# Patient Record
Sex: FEMALE | Race: WHITE | NOT HISPANIC OR LATINO | Employment: FULL TIME | URBAN - METROPOLITAN AREA
[De-identification: names, ages, dates, MRNs, and addresses within clinical notes are randomized per-mention and may not be internally consistent; named-entity substitution may affect disease eponyms.]

---

## 2024-05-02 ENCOUNTER — OFFICE VISIT (OUTPATIENT)
Dept: OPHTHALMOLOGY | Facility: CLINIC | Age: 59
End: 2024-05-02
Payer: COMMERCIAL

## 2024-05-02 DIAGNOSIS — M35.00 SJOGREN'S SYNDROME, WITH UNSPECIFIED ORGAN INVOLVEMENT (MULTI): Primary | ICD-10-CM

## 2024-05-02 DIAGNOSIS — Z79.899 LONG-TERM USE OF PLAQUENIL: ICD-10-CM

## 2024-05-02 DIAGNOSIS — H52.03 HYPEROPIA, BILATERAL: ICD-10-CM

## 2024-05-02 PROCEDURE — 92083 EXTENDED VISUAL FIELD XM: CPT | Performed by: OPHTHALMOLOGY

## 2024-05-02 PROCEDURE — 92014 COMPRE OPH EXAM EST PT 1/>: CPT | Performed by: OPHTHALMOLOGY

## 2024-05-02 PROCEDURE — 92134 CPTRZ OPH DX IMG PST SGM RTA: CPT | Performed by: OPHTHALMOLOGY

## 2024-05-02 RX ORDER — CLONAZEPAM 0.5 MG/1
TABLET ORAL
COMMUNITY

## 2024-05-02 RX ORDER — HYDROQUINONE 40 MG/G
CREAM TOPICAL
COMMUNITY
Start: 2023-01-09

## 2024-05-02 RX ORDER — NYSTATIN 100000 U/G
1 CREAM TOPICAL 2 TIMES DAILY
COMMUNITY
Start: 2023-08-18

## 2024-05-02 RX ORDER — CELECOXIB 200 MG/1
200 CAPSULE ORAL 2 TIMES DAILY
COMMUNITY

## 2024-05-02 ASSESSMENT — REFRACTION_WEARINGRX
OD_ADD: +1.75
OS_CYLINDER: -1.25
OS_AXIS: 140
OS_ADD: +1.75
OD_SPHERE: +3.50
OD_CYLINDER: -0.50
OS_SPHERE: +3.50
OD_AXIS: 035

## 2024-05-02 ASSESSMENT — REFRACTION_MANIFEST
OS_AXIS: 140
OD_AXIS: 035
OD_SPHERE: +4.25
OS_ADD: +1.75
OS_CYLINDER: -1.00
OS_SPHERE: +4.25
OD_CYLINDER: -0.25
OD_ADD: +1.75

## 2024-05-02 ASSESSMENT — VISUAL ACUITY
CORRECTION_TYPE: GLASSES
METHOD: SNELLEN - LINEAR
OS_CC: 20/25+2
OD_CC: 20/25-2

## 2024-05-02 ASSESSMENT — EXTERNAL EXAM - LEFT EYE: OS_EXAM: NORMAL

## 2024-05-02 ASSESSMENT — TONOMETRY
OS_IOP_MMHG: 12
IOP_METHOD: GOLDMANN APPLANATION
OD_IOP_MMHG: 12

## 2024-05-02 ASSESSMENT — CUP TO DISC RATIO
OD_RATIO: .3
OS_RATIO: .3

## 2024-05-02 ASSESSMENT — EXTERNAL EXAM - RIGHT EYE: OD_EXAM: NORMAL

## 2024-05-02 NOTE — PROGRESS NOTES
Assessment/Plan   Diagnoses and all orders for this visit:  Sjogren's syndrome, with unspecified organ involvement (Multi)  -     OCT, Retina - OU - Both Eyes  -     Molina Visual Field - OU - Both Eyes  vision, exam of macula, OCT macula and central visual [id number] normal and stable. I discussed the cumulative risk of treatment. Recommend repeat exam and testing in a year.   Long-term use of Plaquenil  Hyperopia, bilateral   Glasses Per VidSys optom

## 2025-04-17 ENCOUNTER — APPOINTMENT (OUTPATIENT)
Dept: GASTROENTEROLOGY | Facility: CLINIC | Age: 60
End: 2025-04-17
Payer: COMMERCIAL

## 2025-04-17 VITALS — HEIGHT: 66 IN | BODY MASS INDEX: 21.14 KG/M2

## 2025-04-17 DIAGNOSIS — K21.9 GASTROESOPHAGEAL REFLUX DISEASE WITHOUT ESOPHAGITIS: Primary | ICD-10-CM

## 2025-04-17 PROCEDURE — 99213 OFFICE O/P EST LOW 20 MIN: CPT | Performed by: INTERNAL MEDICINE

## 2025-04-17 RX ORDER — ESOMEPRAZOLE MAGNESIUM 40 MG/1
40 CAPSULE, DELAYED RELEASE ORAL
COMMUNITY
End: 2025-04-17 | Stop reason: DRUGHIGH

## 2025-04-17 RX ORDER — HYDROGEN PEROXIDE 3 %
20 SOLUTION, NON-ORAL MISCELLANEOUS
Qty: 30 CAPSULE | Refills: 11 | Status: SHIPPED | OUTPATIENT
Start: 2025-04-17 | End: 2026-04-17

## 2025-04-17 NOTE — PROGRESS NOTES
Subjective     History of Present Illness:   Sheri Flores is a 59 y.o. female who presents to GI clinic for longstanding symptoms of reflux which have been well-controlled on Nexium 40 mg for several years.  Also history of Sjogren's/autoimmune syndrome.  No dysphagia, odynophagia.  Has not tried less intensive therapy for reflux.    Last EGD 2021.  -- Mild chronic nonspecific gastritis; normal duodenum  Last colonoscopy 2021 tubular adenoma (as well as hyperplastic polyp and biopsies for microscopic colitis negative)  Due for repeat surveillance colonoscopy 12/2026    Review of Systems  Review of Systems    Social History   reports that she has never smoked. She has never used smokeless tobacco. Alcohol use questions deferred to the physician. Drug use questions deferred to the physician.     Allergies  RX Allergies[1]    Medications  Current Outpatient Medications   Medication Instructions    celecoxib (CELEBREX) 200 mg, 2 times daily    clonazePAM (KlonoPIN) 0.5 mg tablet TAKE 1 TABLET BY MOUTH AT BEDTIME AS NEEDED FOR UP TO 60 DAYS.    esomeprazole (NEXIUM) 40 mg, oral, Daily before breakfast, Do not open capsule.    hydroquinone 4 % cream Apply once daily to facial spots at bedtime for 6 weeks on, then discontinue. If dark spots persist, repeat application cycle once.    nystatin (Mycostatin) cream 1 Application, Topical, 2 times daily        Objective   There were no vitals taken for this visit.   Physical Exam  Constitutional:       General: She is not in acute distress.     Appearance: Normal appearance. She is normal weight.   Eyes:      Conjunctiva/sclera: Conjunctivae normal.      Pupils: Pupils are equal, round, and reactive to light.   Neurological:      Mental Status: She is alert.   Psychiatric:         Mood and Affect: Mood normal.         Behavior: Behavior normal.         Thought Content: Thought content normal.         Judgment: Judgment normal.                       Assessment/Plan   Sheri MOCTEZUMA  Mark is a 59 y.o. female who presents to GI clinic for GERD well-controlled with Nexium 40 mg a day.  Discussed issues and possibilities of side effects/long-term effects of PPIs.  Optimal management would be lowest dose needed to control symptoms which may be a challenge in view of her Sjogren's and longstanding reflux.      Plan    Nexium 20 mg daily    If ineffective go back to 40 mg    If doing well on 20 mg after 6 months would consider a trial of going to either a lansoprazole 15 mg or a omeprazole 10 mg.      Marcio Wells MD              [1] Not on File

## 2025-05-05 ENCOUNTER — APPOINTMENT (OUTPATIENT)
Dept: OPHTHALMOLOGY | Facility: CLINIC | Age: 60
End: 2025-05-05
Payer: COMMERCIAL

## 2025-05-05 DIAGNOSIS — Z79.899 LONG-TERM USE OF PLAQUENIL: Primary | ICD-10-CM

## 2025-05-05 DIAGNOSIS — H04.129 DRY EYE: ICD-10-CM

## 2025-05-05 DIAGNOSIS — H52.03 HYPEROPIA, BILATERAL: ICD-10-CM

## 2025-05-05 DIAGNOSIS — M35.00 SJOGREN'S SYNDROME, WITH UNSPECIFIED ORGAN INVOLVEMENT (MULTI): ICD-10-CM

## 2025-05-05 PROCEDURE — 92134 CPTRZ OPH DX IMG PST SGM RTA: CPT | Performed by: OPHTHALMOLOGY

## 2025-05-05 PROCEDURE — 99213 OFFICE O/P EST LOW 20 MIN: CPT | Performed by: OPHTHALMOLOGY

## 2025-05-05 PROCEDURE — 92083 EXTENDED VISUAL FIELD XM: CPT | Performed by: OPHTHALMOLOGY

## 2025-05-05 RX ORDER — ESTRADIOL 0.04 MG/D
FILM, EXTENDED RELEASE TRANSDERMAL
COMMUNITY

## 2025-05-05 RX ORDER — HYDROXYCHLOROQUINE SULFATE 200 MG/1
1 TABLET, FILM COATED ORAL DAILY
COMMUNITY

## 2025-05-05 ASSESSMENT — ENCOUNTER SYMPTOMS
EYES NEGATIVE: 0
CONSTITUTIONAL NEGATIVE: 0
RESPIRATORY NEGATIVE: 0
ENDOCRINE NEGATIVE: 0
ALLERGIC/IMMUNOLOGIC NEGATIVE: 0
NEUROLOGICAL NEGATIVE: 0
MUSCULOSKELETAL NEGATIVE: 0
CARDIOVASCULAR NEGATIVE: 0
GASTROINTESTINAL NEGATIVE: 0
ENDOCRINE COMMENTS: HVF
PSYCHIATRIC NEGATIVE: 0
HEMATOLOGIC/LYMPHATIC NEGATIVE: 0

## 2025-05-05 ASSESSMENT — TONOMETRY
OD_IOP_MMHG: 12
IOP_METHOD: GOLDMANN APPLANATION
OS_IOP_MMHG: 12

## 2025-05-05 ASSESSMENT — REFRACTION_WEARINGRX
OS_SPHERE: +4.25
OD_CYLINDER: -0.25
OS_CYLINDER: -1.00
OD_ADD: +1.75
OD_AXIS: 035
OS_AXIS: 140
OS_ADD: +1.75
OD_SPHERE: +4.25

## 2025-05-05 ASSESSMENT — CONF VISUAL FIELD
OS_INFERIOR_NASAL_RESTRICTION: 0
OD_SUPERIOR_NASAL_RESTRICTION: 0
OS_INFERIOR_TEMPORAL_RESTRICTION: 0
OD_INFERIOR_NASAL_RESTRICTION: 0
OD_INFERIOR_TEMPORAL_RESTRICTION: 0
OS_SUPERIOR_NASAL_RESTRICTION: 0
METHOD: COUNTING FINGERS
OS_NORMAL: 1
OS_SUPERIOR_TEMPORAL_RESTRICTION: 0
OD_SUPERIOR_TEMPORAL_RESTRICTION: 0
OD_NORMAL: 1

## 2025-05-05 ASSESSMENT — CUP TO DISC RATIO
OS_RATIO: .3
OD_RATIO: .3

## 2025-05-05 ASSESSMENT — VISUAL ACUITY
METHOD: SNELLEN - LINEAR
CORRECTION_TYPE: GLASSES
OS_CC: 20/25
OD_CC+: -2
OD_CC: 20/20

## 2025-05-05 ASSESSMENT — EXTERNAL EXAM - LEFT EYE: OS_EXAM: NORMAL

## 2025-05-05 ASSESSMENT — REFRACTION_MANIFEST
OD_AXIS: 040
OS_AXIS: 130
OS_CYLINDER: -1.50
OS_SPHERE: +5.50
OD_SPHERE: +5.00
OS_ADD: +2.50
OD_ADD: +2.50
OD_CYLINDER: -0.25

## 2025-05-05 ASSESSMENT — EXTERNAL EXAM - RIGHT EYE: OD_EXAM: NORMAL

## 2025-05-05 NOTE — PROGRESS NOTES
Assessment/Plan   Diagnoses and all orders for this visit:  Long-term use of Plaquenil  -     Molina Visual Field - OU - Both Eyes  -     OCT, Retina - OU - Both Eyes  vision, exam of macula, OCT macula and central visual [id number] normal and stable. I discussed the cumulative risk of treatment. Recommend repeat exam and testing in a year.   Hyperopia, bilateral  Sjogren's syndrome, with unspecified organ involvement (Multi)  Dry eye  Try gel or fani at night

## 2025-09-09 ENCOUNTER — APPOINTMENT (OUTPATIENT)
Dept: RHEUMATOLOGY | Facility: CLINIC | Age: 60
End: 2025-09-09
Payer: COMMERCIAL

## 2025-11-24 ENCOUNTER — APPOINTMENT (OUTPATIENT)
Dept: DERMATOLOGY | Facility: CLINIC | Age: 60
End: 2025-11-24
Payer: COMMERCIAL

## 2026-05-19 ENCOUNTER — APPOINTMENT (OUTPATIENT)
Dept: OPHTHALMOLOGY | Facility: CLINIC | Age: 61
End: 2026-05-19
Payer: COMMERCIAL